# Patient Record
Sex: FEMALE | Race: WHITE
[De-identification: names, ages, dates, MRNs, and addresses within clinical notes are randomized per-mention and may not be internally consistent; named-entity substitution may affect disease eponyms.]

---

## 2017-01-29 ENCOUNTER — HOSPITAL ENCOUNTER (EMERGENCY)
Dept: HOSPITAL 17 - PHED | Age: 32
Discharge: HOME | End: 2017-01-29
Payer: COMMERCIAL

## 2017-01-29 VITALS
DIASTOLIC BLOOD PRESSURE: 88 MMHG | SYSTOLIC BLOOD PRESSURE: 124 MMHG | OXYGEN SATURATION: 99 % | HEART RATE: 88 BPM | RESPIRATION RATE: 18 BRPM | TEMPERATURE: 97.8 F

## 2017-01-29 VITALS — WEIGHT: 165.35 LBS | BODY MASS INDEX: 25.95 KG/M2 | HEIGHT: 67 IN

## 2017-01-29 DIAGNOSIS — J20.9: Primary | ICD-10-CM

## 2017-01-29 DIAGNOSIS — Z87.891: ICD-10-CM

## 2017-01-29 PROCEDURE — 94664 DEMO&/EVAL PT USE INHALER: CPT

## 2017-01-29 PROCEDURE — 99283 EMERGENCY DEPT VISIT LOW MDM: CPT

## 2017-01-29 PROCEDURE — 71020: CPT

## 2017-01-29 PROCEDURE — 96372 THER/PROPH/DIAG INJ SC/IM: CPT

## 2017-01-29 PROCEDURE — 87804 INFLUENZA ASSAY W/OPTIC: CPT

## 2017-01-29 NOTE — RADHPO
EXAM DATE/TIME:  01/29/2017 09:22 

 

[facility] COMPARISON:     

No previous studies available for comparison.

 

                     

INDICATIONS :     

Cough, short of breath, chest pains

                     

 

MEDICAL HISTORY :     

None.          

 

SURGICAL HISTORY :     

None.   

 

ENCOUNTER:     

Initial                                        

 

ACUITY:     

4 - 6 days      

 

PAIN SCORE:     

6/10

 

LOCATION:     

Bilateral chest 

 

FINDINGS:     

PA and lateral views of the chest demonstrate the lungs to be symmetrically aerated without evidence 
of mass, infiltrate or effusion.  The cardiomediastinal contours are unremarkable.  Osseous structure
s are intact.

 

CONCLUSION:     

Normal examination for a patient of this age.  

 

 

 

 Festus Pinto MD on January 29, 2017 at 9:36           

Board Certified Radiologist.

 This report was verified electronically.

## 2017-01-29 NOTE — PD
HPI


Chief Complaint:  Cold / Flu Symptoms


Time Seen by Provider:  09:09


Travel History


International Travel<30 days:  No


Contact w/Intl Traveler<30days:  No


Traveled to known affect area:  No





History of Present Illness


HPI


Patient is a 31-year-old female who presents to emergency room with complaints 

of not feeling well since Tuesday.  Patient reports that since Tuesday, she has 

had a runny nose, postnasal drip, sore throat, cough, congestion with 

productive white mucus.  Patient reports that every time she coughs, she has 

coughing fits and this hurts her chest.  Patient reports no sick contacts at 

home.  Reports no recent travels.  Patient denies any fevers or chills.  

Patient denies myalgias.  Patient reports that she had started a Z-Bon as well 

as albuterol puffers on Tuesday and has completed full course of antibiotics 

without resolution of symptoms.  Patient with no abdominal pain, nausea 

vomiting at this time.  Patient with no other complaints.





PFSH


Past Medical History


Cancer:  No


Diabetes:  No


Glaucoma:  No


Hepatitis:  No


Hypertension:  No


Thyroid Disease:  No


Pregnant?:  Not Pregnant


LMP:  2 weeks ago





Past Surgical History


Cholecystectomy:  Yes


Gynecologic Surgery:  Yes (2009 LAPAROSCOPY HYSTEROSCOPY CYSTOSCOPY)


Other Surgery:  Yes (2010 COLONOSCOPY AND EGD)





Social History


Alcohol Use:  Yes (VERY RARELY)


Tobacco Use:  No (STOPPED 2009)


Substance Use:  No





Allergies-Medications


(Allergen,Severity, Reaction):  


Coded Allergies:  


     No Known Allergies (Verified , 1/29/17)


Reported Meds & Prescriptions





Reported Meds & Active Scripts


Active


Reported


[Gerd Med]     


[Birth Control ]     


Fluoxetine (Fluoxetine HCl) 20 Mg Tab 20 Mg PO DAILY








Review of Systems


General / Constitutional:  No: Fever, Chills


Eyes:  No: Visual changes


HENT:  Positive: Sore Throat, Rhinorrhea, Congestion, Earache,  No: Headaches, 

Neck Pain


Cardiovascular:  No: Chest Pain or Discomfort


Respiratory:  Positive: Cough, Shortness of Breath


Gastrointestinal:  No: Nausea, Vomiting, Diarrhea, Abdominal Pain


Genitourinary:  No: Dysuria


Musculoskeletal:  No: Pain


Skin:  No Rash


Neurologic:  No: Weakness


Psychiatric:  No: Depression


Endocrine:  No: Polydipsia


Hematologic/Lymphatic:  No: Easy Bruising





Physical Exam


Narrative


GENERAL: No acute distress, nontoxic


SKIN: Warm and dry.


HEAD: Atraumatic. Normocephalic. 


EYES: Pupils equal and round. No scleral icterus. No injection or drainage. 


ENT: No nasal bleeding or discharge.  Mucous membranes pink and moist.  

Bilateral tympanic membranes with no signs of erythema or drainage or fluid 

behind the ears.


NECK: Trachea midline. No JVD.  Patient with no meningeal signs, negative Kernig

's and Babinski's sign


CARDIOVASCULAR: Regular rate and rhythm.  No murmur appreciated.


RESPIRATORY: No accessory muscle use. Clear to auscultation. Breath sounds 

equal bilaterally. 


GASTROINTESTINAL: Abdomen soft, non-tender, nondistended. 


MUSCULOSKELETAL: No obvious deformities. No clubbing.  No cyanosis.  No edema.





Data


Data


Last Documented VS





Vital Signs








  Date Time  Temp Pulse Resp B/P Pulse Ox O2 Delivery O2 Flow Rate FiO2


 


1/29/17 09:02 97.8 88 18 124/88 99   








Orders





 Influenzae A/B Antigen (1/29/17 09:14)


Chest, Pa & Lat (1/29/17 09:14)


Dexamethasone Inj (Decadron Inj) (1/29/17 09:15)


Albuterol Neb (Albuterol Neb) (1/29/17 09:15)








MDM


Medical Decision Making


Medical Screen Exam Complete:  Yes


Emergency Medical Condition:  Yes


Interpretation(s)





Vital Signs








  Date Time  Temp Pulse Resp B/P Pulse Ox O2 Delivery O2 Flow Rate FiO2


 


1/29/17 09:02 97.8 88 18 124/88 99   








Differential Diagnosis


Viral syndrome, pneumonia, influenza, costochondritis


Narrative Course


Patient is a 31-year-old female presents most room with complaints of not 

feeling well since Tuesday.  Patient with runny nose, postnasal drip, cough 

congestion since Tuesday, patient reports that she finished a course of 

antibiotics without any resolution of symptoms.  Patient with no sick contacts 

at home.








Vital Signs








  Date Time  Temp Pulse Resp B/P Pulse Ox O2 Delivery O2 Flow Rate FiO2


 


1/29/17 09:02 97.8 88 18 124/88 99   





Overall, vital signs are stable.  Patient is nontoxic in appearance.





X-ray of chest ordered for evaluation of possible pneumonia.  Will swab patient 

for evaluation of possible pneumonia.





Patient with most likely viral syndrome at this time, discussed with patient 

need for rest, plenty of fluids.  Will continue to monitor patient.





Patient reevaluated, reviewed x-ray of the chest as well as negative influenza.





Patient most likely with viral bronchitis.  Patient recently finished a full 

course of azithromycin.  Patient afebrile and nontoxic on appearance.  

Discussed with patient need to follow-up with primary care doctor.  Discussed 

signs and symptoms of when to return emergency room.  Patient appreciative care.





Diagnosis





 Primary Impression:  


 Acute bronchitis


 Qualified Code:  J20.9 - Acute bronchitis, unspecified organism


Patient Instructions:  General Instructions





***Additional Instructions:


Please return to ER as needed





Please follow-up with your primary care doctor





Please return to emergency room if symptoms progress or worsen


***Med/Other Pt SpecificInfo:  Prescription(s) given


Scripts


Promethazine-Codeine Liq 6.25-10 Mg/5 Ml Syrp5 Ml PO Q6H PRN (COUGH AND/OR COLD 

SYMPTOMS) 7 Days  Ref 0


   Prov:Subha Nuñez DO         1/29/17 


Prednisone 20 Mg Tab20 Mg PO BID  5 Days  Ref 0


   Prov:Subha Nuñez DO         1/29/17 


Albuterol 8.5 GM Inh (Proair Hfa 8.5 GM Inh)90 Mcg/Act Aer2 Puff INH Q4-6H PRN (

SHORTNESS OF BREATH) #1 INHALER  Ref 0


   108 mcg/actuation


   Prov:Subha Nuñez DO         1/29/17 


Benzonatate (Tessalon Perles)100 Mg [id number] Mg PO TID PRN (COUGH) #30 CAP  Ref 0


   Prov:Subha Nuñez DO         1/29/17


Disposition:  01 DISCHARGE HOME


Condition:  Stable








Subha Nuñez DO Jan 29, 2017 09:30

## 2018-06-22 ENCOUNTER — HOSPITAL ENCOUNTER (EMERGENCY)
Dept: HOSPITAL 17 - PHED | Age: 33
Discharge: HOME | End: 2018-06-22
Payer: COMMERCIAL

## 2018-06-22 VITALS
OXYGEN SATURATION: 100 % | HEART RATE: 75 BPM | RESPIRATION RATE: 16 BRPM | SYSTOLIC BLOOD PRESSURE: 105 MMHG | DIASTOLIC BLOOD PRESSURE: 56 MMHG

## 2018-06-22 VITALS
OXYGEN SATURATION: 100 % | TEMPERATURE: 98 F | HEART RATE: 85 BPM | SYSTOLIC BLOOD PRESSURE: 129 MMHG | DIASTOLIC BLOOD PRESSURE: 66 MMHG | RESPIRATION RATE: 16 BRPM

## 2018-06-22 VITALS
OXYGEN SATURATION: 100 % | HEART RATE: 70 BPM | RESPIRATION RATE: 16 BRPM | SYSTOLIC BLOOD PRESSURE: 93 MMHG | DIASTOLIC BLOOD PRESSURE: 62 MMHG

## 2018-06-22 VITALS — HEIGHT: 67 IN | WEIGHT: 176.37 LBS | BODY MASS INDEX: 27.68 KG/M2

## 2018-06-22 VITALS
SYSTOLIC BLOOD PRESSURE: 104 MMHG | OXYGEN SATURATION: 99 % | DIASTOLIC BLOOD PRESSURE: 70 MMHG | HEART RATE: 80 BPM | RESPIRATION RATE: 16 BRPM

## 2018-06-22 VITALS
DIASTOLIC BLOOD PRESSURE: 66 MMHG | OXYGEN SATURATION: 98 % | RESPIRATION RATE: 16 BRPM | HEART RATE: 71 BPM | SYSTOLIC BLOOD PRESSURE: 96 MMHG

## 2018-06-22 DIAGNOSIS — O23.41: Primary | ICD-10-CM

## 2018-06-22 DIAGNOSIS — O21.9: ICD-10-CM

## 2018-06-22 DIAGNOSIS — E87.6: ICD-10-CM

## 2018-06-22 DIAGNOSIS — O26.891: ICD-10-CM

## 2018-06-22 DIAGNOSIS — Z3A.01: ICD-10-CM

## 2018-06-22 DIAGNOSIS — O99.281: ICD-10-CM

## 2018-06-22 DIAGNOSIS — R10.2: ICD-10-CM

## 2018-06-22 LAB
ALBUMIN SERPL-MCNC: 3.9 GM/DL (ref 3.4–5)
ALP SERPL-CCNC: 39 U/L (ref 45–117)
ALT SERPL-CCNC: 20 U/L (ref 10–53)
AST SERPL-CCNC: 9 U/L (ref 15–37)
BACTERIA #/AREA URNS HPF: (no result) /HPF
BASOPHILS # BLD AUTO: 0 TH/MM3 (ref 0–0.2)
BASOPHILS NFR BLD: 0.8 % (ref 0–2)
BILIRUB SERPL-MCNC: 0.5 MG/DL (ref 0.2–1)
BUN SERPL-MCNC: 6 MG/DL (ref 7–18)
CALCIUM SERPL-MCNC: 8.4 MG/DL (ref 8.5–10.1)
CHLORIDE SERPL-SCNC: 106 MEQ/L (ref 98–107)
COLOR UR: YELLOW
CREAT SERPL-MCNC: 0.63 MG/DL (ref 0.5–1)
EOSINOPHIL # BLD: 0 TH/MM3 (ref 0–0.4)
EOSINOPHIL NFR BLD: 1 % (ref 0–4)
ERYTHROCYTE [DISTWIDTH] IN BLOOD BY AUTOMATED COUNT: 11.2 % (ref 11.6–17.2)
GFR SERPLBLD BASED ON 1.73 SQ M-ARVRAT: 110 ML/MIN (ref 89–?)
GLUCOSE SERPL-MCNC: 106 MG/DL (ref 74–106)
GLUCOSE UR STRIP-MCNC: (no result) MG/DL
HCO3 BLD-SCNC: 24.3 MEQ/L (ref 21–32)
HCT VFR BLD CALC: 38.7 % (ref 35–46)
HGB BLD-MCNC: 13.2 GM/DL (ref 11.6–15.3)
HGB UR QL STRIP: (no result)
KETONES UR STRIP-MCNC: (no result) MG/DL
LEUKOCYTE ESTERASE UR QL STRIP: (no result) /HPF (ref 0–5)
LYMPHOCYTES # BLD AUTO: 1.8 TH/MM3 (ref 1–4.8)
LYMPHOCYTES NFR BLD AUTO: 42.9 % (ref 9–44)
MCH RBC QN AUTO: 31.7 PG (ref 27–34)
MCHC RBC AUTO-ENTMCNC: 34.1 % (ref 32–36)
MCV RBC AUTO: 93.1 FL (ref 80–100)
MONOCYTE #: 0.3 TH/MM3 (ref 0–0.9)
MONOCYTES NFR BLD: 6.3 % (ref 0–8)
NEUTROPHILS # BLD AUTO: 2.2 TH/MM3 (ref 1.8–7.7)
NEUTROPHILS NFR BLD AUTO: 49 % (ref 16–70)
NITRITE UR QL STRIP: (no result)
PLATELET # BLD: 187 TH/MM3 (ref 150–450)
PMV BLD AUTO: 7.6 FL (ref 7–11)
PROT SERPL-MCNC: 7.3 GM/DL (ref 6.4–8.2)
RBC # BLD AUTO: 4.16 MIL/MM3 (ref 4–5.3)
SODIUM SERPL-SCNC: 138 MEQ/L (ref 136–145)
SP GR UR STRIP: 1.01 (ref 1–1.03)
SQUAMOUS #/AREA URNS HPF: (no result) /HPF (ref 0–5)
URINE LEUKOCYTE ESTERASE: (no result)
WBC # BLD AUTO: 4.3 TH/MM3 (ref 4–11)

## 2018-06-22 PROCEDURE — 80053 COMPREHEN METABOLIC PANEL: CPT

## 2018-06-22 PROCEDURE — 96365 THER/PROPH/DIAG IV INF INIT: CPT

## 2018-06-22 PROCEDURE — 96367 TX/PROPH/DG ADDL SEQ IV INF: CPT

## 2018-06-22 PROCEDURE — 96375 TX/PRO/DX INJ NEW DRUG ADDON: CPT

## 2018-06-22 PROCEDURE — 96361 HYDRATE IV INFUSION ADD-ON: CPT

## 2018-06-22 PROCEDURE — 85025 COMPLETE CBC W/AUTO DIFF WBC: CPT

## 2018-06-22 PROCEDURE — 76700 US EXAM ABDOM COMPLETE: CPT

## 2018-06-22 PROCEDURE — 96366 THER/PROPH/DIAG IV INF ADDON: CPT

## 2018-06-22 PROCEDURE — 87086 URINE CULTURE/COLONY COUNT: CPT

## 2018-06-22 PROCEDURE — 84702 CHORIONIC GONADOTROPIN TEST: CPT

## 2018-06-22 PROCEDURE — 99284 EMERGENCY DEPT VISIT MOD MDM: CPT

## 2018-06-22 PROCEDURE — 76817 TRANSVAGINAL US OBSTETRIC: CPT

## 2018-06-22 PROCEDURE — 81001 URINALYSIS AUTO W/SCOPE: CPT

## 2018-06-22 NOTE — RADRPT
EXAM DATE:  2018 7:41 PM EDT

AGE/SEX:        32 years / Female



INDICATIONS:  Pelvic pain.  Nausea vomiting. 



CLINICAL DATA:  This is the patient's initial encounter. Patient reports that signs and symptoms have
 been present for 2 weeks and indicates a pain score of 3/10. 

                                                                          

MEDICAL/SURGICAL HISTORY:       . Pregnancy.  . Laparoscopy.  Hysteroscopy.  Cystoscopy.



COMPARISON:      [facility], US LEG VENOUS DOPPLER, RIGHT, 2016.  . 

No external comparison.



TECHNIQUE:  Real-time ultrasound of the pelvis was performed using an endovaginal transducer.



[facility] [place]



MEASUREMENTS:

Uterus:__8.8 x 4.5 x 6.3 cm 

Endometrial Stripe:__>20 mm

Right Ovary:__ 4.0 x 3.0 x 3.0 cm

Left Ovary:__ 3.1 x 1.7 x 1.7 cm



FINDINGS:

Ultrasound of the pelvis via transabdominal transvaginal approach demonstrates a single viable intrau
terine pregnancy with a crown-rump length of  5.8 mm corresponding with a 6 week 3 day gestation. Car
diac activity is identified at 125 beats per minute. No free fluid or adnexal masses are identified. 




Examination of the right ovary demonstrates no abnormality. 



Examination of the left ovary demonstrates no abnormality.  

CONCLUSION:  

1.  Single viable intrauterine pregnancy at 6 weeks 3 days



Electronically signed by: Julio Alfonso MD  2018 8:17 PM EDT

## 2018-06-22 NOTE — PD
Physical Exam


Date Seen by Provider:  Jun 22, 2018


Narrative


Care is assumed at 7 PM pending quantitative hCG and ultrasound.  The patient 

is an early pregnancy and presents with nausea, vomiting and diarrhea.  She is 

also complaining with some pelvic cramping but no bleeding or discharge.





Data


Data


Last Documented VS





Vital Signs








  Date Time  Temp Pulse Resp B/P (MAP) Pulse Ox O2 Delivery O2 Flow Rate FiO2


 


6/22/18 19:37  71 16 96/66 (76) 98 Room Air  


 


6/22/18 17:44 98.0       








Orders





 Orders


Urinalysis - C+S If Indicated (6/22/18 17:48)


Beta Hcg (Quant/Titer) (6/22/18 18:00)


Complete Blood Count With Diff (6/22/18 18:00)


Comprehensive Metabolic Panel (6/22/18 18:00)


Us Pelvis (Ques Pr/Ect)W Trans (6/22/18 )


Iv Access Insert/Monitor (6/22/18 18:00)


Sodium Chloride 0.9% Flush (Ns Flush) (6/22/18 18:00)


Sodium Chlor 0.9% 1000 Ml Inj (Ns 1000 M (6/22/18 18:00)


Sodium Chlor 0.9% 1000 Ml Inj (Ns 1000 M (6/22/18 18:00)


Metoclopramide Inj (Reglan Inj) (6/22/18 18:00)


Urine Culture (6/22/18 17:54)


Ceftriaxone Inj (Rocephin Inj) (6/22/18 18:30)


Potassium Chloride (Kcl) (6/22/18 19:00)


Potassium Chlor 20 Meq Premix (Kcl 20 Me (6/22/18 19:00)





Labs





Laboratory Tests








Test


  6/22/18


17:54 6/22/18


18:06


 


Urine Color YELLOW  


 


Urine Turbidity CLEAR  


 


Urine pH 6.5  


 


Urine Specific Gravity 1.010  


 


Urine Protein NEG mg/dL  


 


Urine Glucose (UA) NEG mg/dL  


 


Urine Ketones NEG mg/dL  


 


Urine Occult Blood NEG  


 


Urine Nitrite NEG  


 


Urine Bilirubin NEG  


 


Urine Urobilinogen 0.2 MG/DL  


 


Urine Leukocyte Esterase NEG  


 


Urine WBC 0-2 /hpf  


 


Urine Squamous Epithelial


Cells 0-5 /hpf 


  


 


 


Urine Bacteria MOD /hpf  


 


Microscopic Urinalysis Comment


  CULTURE


INDICATED 


 


 


White Blood Count  4.3 TH/MM3 


 


Red Blood Count  4.16 MIL/MM3 


 


Hemoglobin  13.2 GM/DL 


 


Hematocrit  38.7 % 


 


Mean Corpuscular Volume  93.1 FL 


 


Mean Corpuscular Hemoglobin  31.7 PG 


 


Mean Corpuscular Hemoglobin


Concent 


  34.1 % 


 


 


Red Cell Distribution Width  11.2 % 


 


Platelet Count  187 TH/MM3 


 


Mean Platelet Volume  7.6 FL 


 


Neutrophils (%) (Auto)  49.0 % 


 


Lymphocytes (%) (Auto)  42.9 % 


 


Monocytes (%) (Auto)  6.3 % 


 


Eosinophils (%) (Auto)  1.0 % 


 


Basophils (%) (Auto)  0.8 % 


 


Neutrophils # (Auto)  2.2 TH/MM3 


 


Lymphocytes # (Auto)  1.8 TH/MM3 


 


Monocytes # (Auto)  0.3 TH/MM3 


 


Eosinophils # (Auto)  0.0 TH/MM3 


 


Basophils # (Auto)  0.0 TH/MM3 


 


CBC Comment  DIFF FINAL 


 


Differential Comment   


 


Blood Urea Nitrogen  6 MG/DL 


 


Creatinine  0.63 MG/DL 


 


Random Glucose  106 MG/DL 


 


Total Protein  7.3 GM/DL 


 


Albumin  3.9 GM/DL 


 


Calcium Level  8.4 MG/DL 


 


Alkaline Phosphatase  39 U/L 


 


Aspartate Amino Transf


(AST/SGOT) 


  9 U/L 


 


 


Alanine Aminotransferase


(ALT/SGPT) 


  20 U/L 


 


 


Total Bilirubin  0.5 MG/DL 


 


Sodium Level  138 MEQ/L 


 


Potassium Level  2.9 MEQ/L 


 


Chloride Level  106 MEQ/L 


 


Carbon Dioxide Level  24.3 MEQ/L 


 


Anion Gap  8 MEQ/L 


 


Estimat Glomerular Filtration


Rate 


  110 ML/MIN 


 


 


Human Chorionic Gonadotropin,


Quant 


  83879 MIU/ML 


 











[facility]


Supervised Visit with REBEKAH:  No


Narrative Course


This patient presents for the evaluation of nausea, vomiting diarrhea in 

pregnancy.  She is also now complaining with pelvic cramping.  Quantitative hCG 

and pelvic ultrasound are pending.





CBC & BMP Diagram


6/22/18 18:06








Total Protein 7.3, Albumin 3.9, Calcium Level 8.4 L, Alkaline Phosphatase 39 L, 

Aspartate Amino Transf (AST/SGOT) 9 L, Alanine Aminotransferase (ALT/SGPT) 20, 

Total Bilirubin 0.5





Potassium is being replaced.





Quantitative hCG 61,000





UA>>mod bact








Last Impressions








Pelvis Ultrasound 6/22/18 0000 Signed





Impressions: 





 CONCLUSION:  





 1.  Single viable intrauterine pregnancy at 6 weeks 3 days





  





 








Diagnosis





 Primary Impression:  


 UTI (urinary tract infection)


 Qualified Codes:  N39.0 - Urinary tract infection, site not specified


 Additional Impressions:  


 Hypokalemia


 Nausea & vomiting


 Qualified Codes:  R11.2 - Nausea with vomiting, unspecified


 Normal IUP (intrauterine pregnancy) on prenatal ultrasound


 Qualified Codes:  Z34.91 - Encounter for supervision of normal pregnancy, 

unspecified, first trimester


Patient Instructions:  General Instructions, Hypokalemia (DC), Nausea and 

Vomiting in Pregnancy (ED), Urinary Tract Infection in Pregnancy (DC)


***Med/Other Pt SpecificInfo:  Prescription(s) given


Scripts


Ondansetron (Zofran) 4 Mg Tab


4 MG PO Q6HR Y for NAUSEA OR VOMITING, #30 TAB 0 Refills


   Prov: Bing Allen MD         6/22/18 


Potassium Chloride ER (Potassium Chloride ER) 20 Meq Tab


20 MEQ PO BID for Electrolyte Replacement, #60 TAB 0 Refills


   Prov: Bing Allen MD         6/22/18 


Nitrofurantoin Monohydrate Macrocrystals (Macrobid) 100 Mg Cap


100 MG PO BID for Infection for 5 Days, #10 CAP 0 Refills


   Prov: Bing Allen MD         6/22/18


Disposition:  01 DISCHARGE HOME


Condition:  Stable











Bing Allen MD Jun 22, 2018 19:43

## 2018-06-22 NOTE — PD
HPI


Chief Complaint:  Pregnancy Related Problem


Time Seen by Provider:  17:52


Travel History


International Travel<30 days:  No


Contact w/Intl Traveler<30days:  No


Traveled to known affect area:  No





History of Present Illness


HPI


Patient is a 32-year-old female who is pregnant with her first child, presents 

the emergency room with intractable nausea and vomiting with diarrhea.  Patient 

reports that she is around 6 weeks pregnant, reports that for the past 6 weeks 

she has not been feeling well.  Patient reports that she has been having nausea 

every morning, reports that along with nausea she has been vomiting.  Patient 

was given a prescription for promethazine, reports that this has not been 

helping her.  Patient reports that she has an appointment with [facility] 

in the following weeks, they will not see her earlier as she is too early in 

pregnancy. Reports increased lower abdominal cramping, denies any vaginal 

bleeding or discharge.  Denies any fever or chills.  Denies dysuria, denies 

urinary urgency or frequency.





PFSH


Past Medical History


Medical History:  Denies Significant Hx


Cancer:  No


Diabetes:  No


Glaucoma:  No


Hepatitis:  No


Hypertension:  No


Thyroid Disease:  No


Pregnant?:  Pregnant


LMP:  5/7/18





Past Surgical History


Cholecystectomy:  Yes


Gynecologic Surgery:  Yes (2009 LAPAROSCOPY HYSTEROSCOPY CYSTOSCOPY)


Other Surgery:  Yes (2010 COLONOSCOPY AND EGD)





Social History


Alcohol Use:  Yes (VERY RARELY)


Tobacco Use:  No (STOPPED 2009)


Substance Use:  No





Allergies-Medications


(Allergen,Severity, Reaction):  


Coded Allergies:  


     No Known Allergies (Verified  Adverse Reaction, Unknown, 6/22/18)


Reported Meds & Prescriptions





Reported Meds & Active Scripts


Active


Promethazine-Codeine Liq 6.25-10 Mg/5 Ml Syrp 5 Ml PO Q6H PRN 7 Days








Review of Systems


General / Constitutional:  No: Fever, Chills


Gastrointestinal:  Positive: Nausea, Vomiting, Diarrhea, No: Abdominal Pain





Physical Exam


Narrative


GENERAL: Mild distress


SKIN: Focused skin assessment warm/dry.


HEAD: Atraumatic. Normocephalic. 


EYES: Pupils equal and round. No scleral icterus. No injection or drainage. 


ENT: No nasal bleeding or discharge.  Mucous membranes pink and moist.


NECK: Trachea midline. No JVD. 


CARDIOVASCULAR: Regular rate and rhythm.  No murmur appreciated.


RESPIRATORY: No accessory muscle use. Clear to auscultation. Breath sounds 

equal bilaterally. 


GASTROINTESTINAL: Abdomen soft, non-tender, nondistended. Hepatic and splenic 

margins not palpable. 


MUSCULOSKELETAL: No obvious deformities. No clubbing.  No cyanosis.  No edema. 


NEUROLOGICAL: Awake and alert. No obvious cranial nerve deficits.  Motor 

grossly within normal limits. Normal speech.


PSYCHIATRIC: Appropriate mood and affect; insight and judgment normal.





Data


Data


Last Documented VS





Vital Signs








  Date Time  Temp Pulse Resp B/P (MAP) Pulse Ox O2 Delivery O2 Flow Rate FiO2


 


6/22/18 18:15  80 16 104/70 (81) 99 Room Air  


 


6/22/18 17:44 98.0       








Orders





 Orders


Urinalysis - C+S If Indicated (6/22/18 17:48)


Beta Hcg (Quant/Titer) (6/22/18 18:00)


Complete Blood Count With Diff (6/22/18 18:00)


Comprehensive Metabolic Panel (6/22/18 18:00)


Us Pelvis (Ques Pr/Ect)W Trans (6/22/18 )


Iv Access Insert/Monitor (6/22/18 18:00)


Sodium Chloride 0.9% Flush (Ns Flush) (6/22/18 18:00)


Sodium Chlor 0.9% 1000 Ml Inj (Ns 1000 M (6/22/18 18:00)


Sodium Chlor 0.9% 1000 Ml Inj (Ns 1000 M (6/22/18 18:00)


Metoclopramide Inj (Reglan Inj) (6/22/18 18:00)


Urine Culture (6/22/18 17:54)


Ceftriaxone Inj (Rocephin Inj) (6/22/18 18:30)


Potassium Chloride (Kcl) (6/22/18 19:00)


Kcl Bolus Inj (6/22/18 19:00)





Labs





Laboratory Tests








Test


  6/22/18


17:54 6/22/18


18:06


 


Urine Color YELLOW  


 


Urine Turbidity CLEAR  


 


Urine pH 6.5  


 


Urine Specific Gravity 1.010  


 


Urine Protein NEG mg/dL  


 


Urine Glucose (UA) NEG mg/dL  


 


Urine Ketones NEG mg/dL  


 


Urine Occult Blood NEG  


 


Urine Nitrite NEG  


 


Urine Bilirubin NEG  


 


Urine Urobilinogen 0.2 MG/DL  


 


Urine Leukocyte Esterase NEG  


 


Urine WBC 0-2 /hpf  


 


Urine Squamous Epithelial


Cells 0-5 /hpf 


  


 


 


Urine Bacteria MOD /hpf  


 


Microscopic Urinalysis Comment


  CULTURE


INDICATED 


 


 


White Blood Count  4.3 TH/MM3 


 


Red Blood Count  4.16 MIL/MM3 


 


Hemoglobin  13.2 GM/DL 


 


Hematocrit  38.7 % 


 


Mean Corpuscular Volume  93.1 FL 


 


Mean Corpuscular Hemoglobin  31.7 PG 


 


Mean Corpuscular Hemoglobin


Concent 


  34.1 % 


 


 


Red Cell Distribution Width  11.2 % 


 


Platelet Count  187 TH/MM3 


 


Mean Platelet Volume  7.6 FL 


 


Neutrophils (%) (Auto)  49.0 % 


 


Lymphocytes (%) (Auto)  42.9 % 


 


Monocytes (%) (Auto)  6.3 % 


 


Eosinophils (%) (Auto)  1.0 % 


 


Basophils (%) (Auto)  0.8 % 


 


Neutrophils # (Auto)  2.2 TH/MM3 


 


Lymphocytes # (Auto)  1.8 TH/MM3 


 


Monocytes # (Auto)  0.3 TH/MM3 


 


Eosinophils # (Auto)  0.0 TH/MM3 


 


Basophils # (Auto)  0.0 TH/MM3 


 


CBC Comment  DIFF FINAL 


 


Differential Comment   


 


Blood Urea Nitrogen  6 MG/DL 


 


Creatinine  0.63 MG/DL 


 


Random Glucose  106 MG/DL 


 


Total Protein  7.3 GM/DL 


 


Albumin  3.9 GM/DL 


 


Calcium Level  8.4 MG/DL 


 


Alkaline Phosphatase  39 U/L 


 


Aspartate Amino Transf


(AST/SGOT) 


  9 U/L 


 


 


Alanine Aminotransferase


(ALT/SGPT) 


  20 U/L 


 


 


Total Bilirubin  0.5 MG/DL 


 


Sodium Level  138 MEQ/L 


 


Potassium Level  2.9 MEQ/L 


 


Chloride Level  106 MEQ/L 


 


Carbon Dioxide Level  24.3 MEQ/L 


 


Anion Gap  8 MEQ/L 


 


Estimat Glomerular Filtration


Rate 


  110 ML/MIN 


 











MDM


Medical Decision Making


Medical Screen Exam Complete:  Yes


Emergency Medical Condition:  Yes


Medical Record Reviewed:  Yes


Interpretation(s)





Vital Signs








  Date Time  Temp Pulse Resp B/P (MAP) Pulse Ox O2 Delivery O2 Flow Rate FiO2


 


6/22/18 17:44 98.0 85 16 129/66 (87) 100   








Differential Diagnosis


Hyperemesis gravidarum, ectopic pregnancy, electrolyte abnormality, viral 

syndrome, gastroenteritis


Narrative Course


Patient is a well-appearing 32-year-old female who is 6 weeks pregnant, 

presents the emergency room with complaints of nausea, vomiting diarrhea and 

lower abdominal cramping with no vaginal bleeding or discharge.  Patient 

reports that she has been tolerating fluids, reports just decreased appetite.  

She does have an appointment with her OB/GYN in the following week.





During the course of the patients emergency department visit, the patients 

history, examination, and differential diagnosis were reviewed with the 

patient. The patient was placed on a cardiac monitor with oximetry and frequent 

blood pressure monitoring. The patient had an IV access obtained and blood work 

sent for analysis. 





The patient was initially provided IVF as well as IV reglan. 





Patient signed out to care doctor of Dr. Allen at change of shift.





CBC & BMP Diagram


6/22/18 18:06








UA positive for moderate bacteria, culture is indicated and sent.  Patient was 

given 1 g of Rocephin for treatment of her UTI, she will be discharged to home 

with antibiotics.





Patient signed out to care doctor of Dr. Allen at change of shift.





Diagnosis





 Primary Impression:  


 UTI (urinary tract infection)











Subha Nuñez DO Jun 22, 2018 18:09